# Patient Record
(demographics unavailable — no encounter records)

---

## 2025-05-06 NOTE — PHYSICAL EXAM
[FreeTextEntry1] : Neurologic: -Mental status: Awake, alert, oriented to person, place, and time. Speech is fluent with intact naming, repetition, and comprehension, no dysarthria. Follows commands. Attention/concentration intact. Fund of knowledge appropriate. -Cranial nerves: II: Visual fields are full to confrontation. III, IV, VI: Extraocular movements are intact without nystagmus. Pupils equally round and reactive to light V: Facial sensation V1-V3 equal and intact VII: Face is symmetric with normal eye closure and smile Motor: Normal bulk and tone. No pronator drift. Strength bilateral upper extremity 5/5, bilateral lower extremities 5/5. Sensation: Intact to light touch bilaterally. Coordination: No dysmetria on finger-to-nose bilaterally Reflexes: 2+ b/l Gait: Narrow gait and steady.

## 2025-05-06 NOTE — END OF VISIT
[] : Resident [FreeTextEntry3] : Pt seen, examined and discussed with neurology resident. Agree with assessment and plan.  [Time Spent: ___ minutes] : I have spent [unfilled] minutes of time on the encounter which excludes teaching and separately reported services.

## 2025-05-06 NOTE — ASSESSMENT
[FreeTextEntry1] : Mr. Smith is a 58 year old right handed male is here for follow up on seizure. H/O one seizure in 2019, followed by small seizure/aura resolved after increasing dose of Keppra, seizure free since then. Imaging was normal, with EEG showing focal slowing. Seizure could be due to remote H/o concussion. He has been stable on Current dose of Keppra. Denies nocturnal tongue biting, urinary incontinence or medication nonadherence.   Plan: - Continue with Keppra 1000 mg BID - Continue with Vitamin B6 supplementation - His PCP is refilling the Keppra - Counselled on seizure aura, and when to seek immediate medical attention. - RTC in 1 year.

## 2025-05-06 NOTE — HISTORY OF PRESENT ILLNESS
[FreeTextEntry1] : Interval History:  Has been feeling well on Keppra 1g BID, has had no events concerning for seizures since his last visit (none since 2019).  He is taking Vitamin B6 along with his Keppra without any complaints.  Feels well otherwise.  Had mild headache yesterday but resolved without any medication.  Previous visit 4/2024:  Since last visit, he has been doing well. No Concern for seizure or seizure like episodes. Denies waking up confused or with headache. He reports compliance with Keppra with no side effects.  Initial hx: Admitted to [facility] following the event: After work on 8/23/19 patient had a can of beer with friends, then he suddenly stood up and complained of not feeling well. Then he started walking around, not responding to questions. Patient appeared pale, took his shirt off, then wandered off. EMS was called, and as they arrived patient appeared agitated, then inside the ambulance reportedly became unresponsive and had a generalized tonic-clonic seizure, duration was not documented. After that per EMS records patient was very combative and was medicated with Versed 10mg IM. 2019: Video EEG: left posterior quadrant slowing. MRI: no abnormalities. Risk factor: H/o concussion in 2000.